# Patient Record
Sex: FEMALE | Race: WHITE | HISPANIC OR LATINO | Employment: FULL TIME | URBAN - NONMETROPOLITAN AREA
[De-identification: names, ages, dates, MRNs, and addresses within clinical notes are randomized per-mention and may not be internally consistent; named-entity substitution may affect disease eponyms.]

---

## 2019-05-13 ENCOUNTER — OFFICE VISIT (OUTPATIENT)
Dept: URGENT CARE | Facility: CLINIC | Age: 44
End: 2019-05-13
Payer: OTHER GOVERNMENT

## 2019-05-13 VITALS
OXYGEN SATURATION: 98 % | HEART RATE: 86 BPM | DIASTOLIC BLOOD PRESSURE: 64 MMHG | SYSTOLIC BLOOD PRESSURE: 114 MMHG | HEIGHT: 67 IN | BODY MASS INDEX: 28.09 KG/M2 | WEIGHT: 179 LBS | RESPIRATION RATE: 14 BRPM | TEMPERATURE: 99.4 F

## 2019-05-13 DIAGNOSIS — S09.90XA CLOSED HEAD INJURY, INITIAL ENCOUNTER: ICD-10-CM

## 2019-05-13 DIAGNOSIS — M54.9 DORSALGIA: ICD-10-CM

## 2019-05-13 PROCEDURE — 99203 OFFICE O/P NEW LOW 30 MIN: CPT | Performed by: FAMILY MEDICINE

## 2019-05-13 RX ORDER — ATORVASTATIN CALCIUM 20 MG/1
20 TABLET, FILM COATED ORAL NIGHTLY
COMMUNITY

## 2019-05-13 RX ORDER — CYCLOBENZAPRINE HCL 10 MG
10 TABLET ORAL 3 TIMES DAILY PRN
Qty: 30 TAB | Refills: 0 | Status: SHIPPED | OUTPATIENT
Start: 2019-05-13

## 2019-05-13 RX ORDER — LISINOPRIL 20 MG/1
20 TABLET ORAL DAILY
COMMUNITY

## 2019-05-13 RX ORDER — HYDROCODONE BITARTRATE AND ACETAMINOPHEN 7.5; 325 MG/1; MG/1
1 TABLET ORAL EVERY 8 HOURS PRN
Qty: 21 TAB | Refills: 0 | Status: SHIPPED | OUTPATIENT
Start: 2019-05-13 | End: 2019-05-23

## 2019-05-13 RX ORDER — MELOXICAM 7.5 MG/1
7.5 TABLET ORAL DAILY
Qty: 7 TAB | Refills: 1 | Status: SHIPPED | OUTPATIENT
Start: 2019-05-13 | End: 2019-05-20

## 2019-05-13 RX ORDER — KETOROLAC TROMETHAMINE 30 MG/ML
60 INJECTION, SOLUTION INTRAMUSCULAR; INTRAVENOUS ONCE
Status: COMPLETED | OUTPATIENT
Start: 2019-05-13 | End: 2019-05-13

## 2019-05-13 RX ADMIN — KETOROLAC TROMETHAMINE 60 MG: 30 INJECTION, SOLUTION INTRAMUSCULAR; INTRAVENOUS at 18:12

## 2019-05-13 ASSESSMENT — ENCOUNTER SYMPTOMS
HEADACHES: 1
MYALGIAS: 1
BACK PAIN: 1

## 2019-05-13 NOTE — PROGRESS NOTES
Subjective:      Shanon Serna is a 43 y.o. female who presents with Other (Head feels tender, lower Tspine hurts x1day )      - This is a pleasant and non toxic appearing 43 y.o. female with car trunk sykes came down on her head yesterday and cordell her back. No LOC. Has non radiating pain to back and neck and a little pain to top of head.           ALLERGIES:  Patient has no known allergies.     PMH:  History reviewed. No pertinent past medical history.     PSH:  History reviewed. No pertinent surgical history.    MEDS:    Current Outpatient Prescriptions:   •  metformin (GLUCOPHAGE) 1000 MG tablet, Take 1,000 mg by mouth 2 times a day, with meals., Disp: , Rfl:   •  atorvastatin (LIPITOR) 20 MG Tab, Take 20 mg by mouth every evening., Disp: , Rfl:   •  SITagliptin (JANUVIA) 100 MG Tab, Take 100 mg by mouth every day., Disp: , Rfl:   •  lisinopril (PRINIVIL) 20 MG Tab, Take 20 mg by mouth every day., Disp: , Rfl:   •  vitamin D (CHOLECALCIFEROL) 1000 UNIT Tab, Take 1,000 Units by mouth every day., Disp: , Rfl:   •  HYDROcodone-acetaminophen (NORCO) 7.5-325 MG per tablet, Take 1 Tab by mouth every 8 hours as needed for up to 10 days., Disp: 21 Tab, Rfl: 0  •  cyclobenzaprine (FLEXERIL) 10 MG Tab, Take 1 Tab by mouth 3 times a day as needed., Disp: 30 Tab, Rfl: 0  •  meloxicam (MOBIC) 7.5 MG Tab, Take 1 Tab by mouth every day for 7 days., Disp: 7 Tab, Rfl: 1    Current Facility-Administered Medications:   •  ketorolac (TORADOL) injection 60 mg, 60 mg, Intramuscular, Once, Mitul Romo M.D.    ** I have documented what I find to be significant in regards to past medical, social, family and surgical history  in my HPI or under PMH/PSH/FH review section, otherwise it is contributory **           HPI    Review of Systems   Musculoskeletal: Positive for back pain and myalgias.   Neurological: Positive for headaches.   All other systems reviewed and are negative.         Objective:     /64   Pulse 86   Temp  "37.4 °C (99.4 °F)   Resp 14   Ht 1.689 m (5' 6.5\")   Wt 81.2 kg (179 lb)   SpO2 98%   BMI 28.46 kg/m²      Physical Exam   Constitutional: She appears well-developed. No distress.   HENT:   Head: Normocephalic and atraumatic.   Neck: Neck supple.   Cardiovascular: Regular rhythm.    No murmur heard.  Pulmonary/Chest: Effort normal and breath sounds normal. No respiratory distress.   Musculoskeletal: She exhibits tenderness.        Arms:  Neurological: She is alert. She exhibits normal muscle tone.   Skin: Skin is warm and dry.   Psychiatric: She has a normal mood and affect. Judgment normal.   Nursing note and vitals reviewed.              Assessment/Plan:         1. Dorsalgia  ketorolac (TORADOL) injection 60 mg    HYDROcodone-acetaminophen (NORCO) 7.5-325 MG per tablet    Consent for Opiate Prescription    cyclobenzaprine (FLEXERIL) 10 MG Tab    meloxicam (MOBIC) 7.5 MG Tab   2. Closed head injury, initial encounter               Dx & d/c instructions discussed w/ patient and/or family members.     ER precautions (worsening signs symptoms and when to go to ER) discussed.    Follow up here or PCP or ER in 2-3 days if symptoms not improving, ER if feeling/getting worse.    Any realistic/common medication side effects (i.e. Rash, GI upset/constipation, sedation, elevation of BP or blood sugars) discussed.     Patient left in stable condition              "